# Patient Record
Sex: MALE | Race: WHITE | NOT HISPANIC OR LATINO
[De-identification: names, ages, dates, MRNs, and addresses within clinical notes are randomized per-mention and may not be internally consistent; named-entity substitution may affect disease eponyms.]

---

## 2017-04-13 ENCOUNTER — RECORDS - HEALTHEAST (OUTPATIENT)
Dept: ADMINISTRATIVE | Facility: OTHER | Age: 32
End: 2017-04-13

## 2017-10-27 ENCOUNTER — OFFICE VISIT - HEALTHEAST (OUTPATIENT)
Dept: INTERNAL MEDICINE | Facility: CLINIC | Age: 32
End: 2017-10-27

## 2017-10-27 DIAGNOSIS — Z00.00 ROUTINE GENERAL MEDICAL EXAMINATION AT A HEALTH CARE FACILITY: ICD-10-CM

## 2017-10-27 ASSESSMENT — MIFFLIN-ST. JEOR: SCORE: 1769.07

## 2021-05-26 ENCOUNTER — RECORDS - HEALTHEAST (OUTPATIENT)
Dept: ADMINISTRATIVE | Facility: CLINIC | Age: 36
End: 2021-05-26

## 2021-05-27 ENCOUNTER — RECORDS - HEALTHEAST (OUTPATIENT)
Dept: ADMINISTRATIVE | Facility: CLINIC | Age: 36
End: 2021-05-27

## 2021-05-31 VITALS — WEIGHT: 179 LBS | BODY MASS INDEX: 25.06 KG/M2 | HEIGHT: 71 IN

## 2021-06-13 NOTE — PROGRESS NOTES
Office Visit-Comprehensive Physical Exam   Shar Fraser   32 y.o. 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 Routine general medical examination at a health care facility  Informed and advised his comprehensive physical exam is normal.  Check urinalysis.  - Urinalysis-UC if Indicated    Follow up as needed.     History of Present Illness   This 32 y.o. old male, patient of , is here for his  comprehensive physical exam.  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  Basically healthy.  Denies vision and hearing problems.  Denies chest pains and shortness of breath.  Not exercising regularly.  Denies urinary and bowel symptoms or disturbances.  Sleeps well.  Eats well.  Non-smoker.  Drinks alcohol only socially.    Review of Systems   A 12 point comprehensive review of systems was negative except as noted..     Medications, Allergies and Problem List   Reviewed and updated             Chief Complaint   Annual Exam (needs physical form filled out by MD for law enforcement program for school, declines flu shot)       Patient Profile   Social History     Social History Narrative    Single. Applying for law enforcement at [employer]. Nonsmoker. Drinks alcohol socially.         Past Medical History   Patient Active Problem List   Diagnosis     Groin pain       Past Surgical History  He has no past surgical history on file.       Medications and Allergies   No current outpatient prescriptions on file.     No Known Allergies     Family and Social History   No family history on file.     Social History   Substance Use Topics     Smoking status: Never Smoker     Smokeless tobacco: None     Alcohol use None        Physical Exam       Physical Exam  BP  "124/80 (Patient Site: Left Arm, Patient Position: Sitting, Cuff Size: Adult Regular)  Pulse 73  Ht 5' 11\" (1.803 m)  Wt 179 lb (81.2 kg)  SpO2 97%  BMI 24.97 kg/m2    General Appearance:    Alert, cooperative, no distress, appears stated age   Head:    Normocephalic, without obvious abnormality, atraumatic   Eyes:    PERRL, conjunctiva/corneas clear, EOM's intact, fundi     benign, both eyes        Ears:    Normal TM's and external ear canals, both ears   Nose:   Nares normal, septum midline, mucosa normal, no drainage    or sinus tenderness   Throat:   Lips, mucosa, and tongue normal; teeth and gums normal   Neck:   Supple, symmetrical, trachea midline, no adenopathy;        thyroid:  No enlargement/tenderness/nodules; no carotid    bruit or JVD   Back:     Symmetric, no curvature, ROM normal, no CVA tenderness   Lungs:     Clear to auscultation bilaterally, respirations unlabored   Chest wall:    No tenderness or deformity   Heart:    Regular rate and rhythm, S1 and S2 normal, no murmur, rub   or gallop   Abdomen:     Soft, non-tender, bowel sounds active all four quadrants,     no masses, no organomegaly   Genitalia:    Normal male without lesion, discharge or tenderness,    circumcised   Rectal:    Deferred   Extremities:   Extremities normal, atraumatic, no cyanosis or edema   Pulses:   2+ and symmetric all extremities   Skin:   Skin color, texture, turgor normal, no rashes or lesions   Lymph nodes:   Cervical, supraclavicular, and axillary nodes normal   Neurologic:   CNII-XII intact. Normal strength, sensation and reflexes       throughout          Additional Information   , Circumcised   Timoteo Ramos MD  Internal Medicine  Contact me at [phone]     Additional Information   No current outpatient prescriptions on file.     No Known Allergies  Social History   Substance Use Topics     Smoking status: Never Smoker     Smokeless tobacco: None     Alcohol use None        "